# Patient Record
Sex: MALE | Race: OTHER | HISPANIC OR LATINO | URBAN - METROPOLITAN AREA
[De-identification: names, ages, dates, MRNs, and addresses within clinical notes are randomized per-mention and may not be internally consistent; named-entity substitution may affect disease eponyms.]

---

## 2023-05-28 ENCOUNTER — EMERGENCY (EMERGENCY)
Facility: HOSPITAL | Age: 43
LOS: 1 days | Discharge: HOME | End: 2023-05-28
Attending: EMERGENCY MEDICINE | Admitting: EMERGENCY MEDICINE
Payer: MEDICAID

## 2023-05-28 VITALS
TEMPERATURE: 98 F | SYSTOLIC BLOOD PRESSURE: 126 MMHG | HEART RATE: 79 BPM | DIASTOLIC BLOOD PRESSURE: 78 MMHG | RESPIRATION RATE: 18 BRPM | OXYGEN SATURATION: 97 %

## 2023-05-28 PROCEDURE — 82962 GLUCOSE BLOOD TEST: CPT

## 2023-05-28 PROCEDURE — 99283 EMERGENCY DEPT VISIT LOW MDM: CPT

## 2023-05-28 PROCEDURE — 99285 EMERGENCY DEPT VISIT HI MDM: CPT

## 2023-05-28 NOTE — ED PROVIDER NOTE - OBJECTIVE STATEMENT
43-year-old male denies past medical history brought in by ambulance after patient was attended to by [facility].  Patient reports drinking "too much alcohol".  EMS found patient awake and alert and ambulatory.  Patient denies any drug use, trauma, hallucinations. denies any past medical history or medication use.

## 2023-05-28 NOTE — ED PROVIDER NOTE - PATIENT PORTAL LINK FT
You can access the FollowMyHealth Patient Portal offered by [facility] by registering at the following website: [email or web address]. By joining [facility]’s FollowMyHealth portal, you will also be able to view your health information using other applications (apps) compatible with our system.

## 2023-05-28 NOTE — ED ADULT NURSE NOTE - NSFALLUNIVINTERV_ED_ALL_ED
Bed/Stretcher in lowest position, wheels locked, appropriate side rails in place/Call bell, personal items and telephone in reach/Instruct patient to call for assistance before getting out of bed/chair/stretcher/Non-slip footwear applied when patient is off stretcher/[place] to call system/Physically safe environment - no spills, clutter or unnecessary equipment/Purposeful proactive rounding/Room/bathroom lighting operational, light cord in reach

## 2023-05-28 NOTE — ED PROVIDER NOTE - CLINICAL SUMMARY MEDICAL DECISION MAKING FREE TEXT BOX
43-year-old male brought in by ambulance for suspected alcohol intoxication.  Patient is awake alert and ambulatory.  Admits to drinking alcohol.  Denies any SI, trauma or hallucinations.  Would like to go home via train back home where he lives in the [place].  Demonstrates full capacity to make medical decisions.

## 2023-05-28 NOTE — ED ADULT NURSE NOTE - NS ED NURSE LEVEL OF CONSCIOUSNESS AFFECT
Calm Per Nitida, ok for 10/3rd at 230pm for loop recorder implant.  Spoke with patient and confirmed date/time.  Reviewed pre procedural instructions, aware Covid testing per PAT.  Vaccinations are up to date, no Covid testing needed. Mailed ILR information to the patient, advised to review, call with any questions.    Prep for case initiated and sent to [facility] updated.    Please check for insurance authorization, thanks!!    LOOP RECORDER IMPLANT - CV [86149 (CPT®)]

## 2023-05-28 NOTE — ED PROVIDER NOTE - NSFOLLOWUPINSTRUCTIONS_ED_ALL_ED_FT
buso de alcohol    LO QUE NECESITA SABER:    El abuso de alcohol significa que usted phillip más de los límites diarios o semanales recomendados. Usted puede estar bebiendo alcohol regularmente o bebiendo grandes cantidades en un corto período (atracones de bebida). Usted sigue tomando, aun cuando esto le cause problemas legales, laborales o con jaime relaciones.    INSTRUCCIONES SOBRE EL AIME HOSPITALARIA:    Llame al número local de emergencias (911 en los [place]), o pídale a alguien que llame si:    Tiene dolor en el pecho o dificultad para respirar de forma repentina.    Usted quiere lastimarse o lastimar a otros.    Usted sufre tosha convulsión.  Busque atención médica de inmediato si:    Usted no puede dejar de vomitar o vomita jovi.    Llame a wiley médico si:    Usted tiene alucinaciones (ve o escucha cosas que no son reales).    Usted tiene preguntas o inquietudes acerca de wiley condición o cuidado.  Medicamentos:    Suplementos vitamínicospara tratar los niveles bajo de vitamina. El alcohol puede impedir la capacidad de wiley cuerpo para absorber suficiente vitaminas annie la vitamina B1. Los suplementos vitamínicos también pueden administrarse para prevenir los daños al cerebro relacionados con el consumo de alcohol.    Standard jaime medicamentos annie se le haya indicado.Consulte con wiley médico si usted kofi que wiley medicamento no le está ayudando o si presenta efectos secundarios. Infórmele al médico si usted es alérgico a algún medicamento. Mantenga tosha lista actualizada de los medicamentos, las vitaminas y los productos herbales que vic. Incluya los siguientes datos de los medicamentos: cantidad, frecuencia y motivo de administración. Traiga con usted la lista o los envases de las píldoras a jaime citas de seguimiento. Lleve la lista de los medicamentos con usted en sarah de tosha emergencia.  Límites recomendados de alcohol:Tosha bebida se define annie 12 onzas (oz) de cerveza, 5 onzas de vino o 1.5 onzas de licor, annie el whisky.    Los hombres de 21 a 64 añosdeberían limitar el consumo de alcohol a 2 tragos al día. No tome más de 4 bebidas por día o más de 14 en tosha semana.    Todos los hombres y las mujeres de 65 años o másdeberían limitar el consumo de alcohol a 1 trago por día. No tome más de 3 bebidas por día o más de 7 en tosha semana. No consuma bebidas alcohólicas si está embarazada.  Problemas de alexa que puede causar el abuso del alcohol:    Cáncer en hígado, páncreas, estómago, colon, riñón o mamas    Derrame cerebral o ataque cardíaco    Enfermedad hepática, renal o pulmonar    Desmayos, pérdida de memoria, daño cerebral o demencia    Diabetes, problemas del sistema inmunológico o deficiencia de tiamina (vitamina B1)    Problemas para usted y wiley bebé si phillip kartik el embarazo  Maneje el consumo de alcohol:    Trabaje con los médicos en los objetivos para beber menos.[place] puede ayudar a prevenir problemas de alexa. Por ejemplo, puede empezar por planificar wiley consumo semanal de alcohol. Será más fácil rodrigo menos bebidas si planifica con antelación.    Cuando randell alcohol, acompáñelo con comida.La comida evitará que el alcohol entre en wiley sistema demasiado rápido. Coma antes de rodrigo wiley primera bebida alcohólica.    Calcule con cuidado el tiempo de jaime bebidas.No tome más de tosha bebida por hora. Standard líquido, annie agua, café o un refresco, entre las bebidas alcohólicas.    No maneje si ha tomado alcohol.Planifique con antelación para tener un viaje seguro a casa. Asegúrese de que alguien que no haya estado bebiendo lo pueda llevar a casa. Planifique el uso de un taxi u otro servicio de transporte, si lo necesita.    No randell alcohol si está tomando shakila medicamento.El alcohol es peligroso cuando se combina con ciertos medicamentos, annie acetaminofeno o un medicamento para la presión arterial. Hable con wiley médico acerca de todos los medicamentos que está tomando.  Acuda a la consulta de control con wiley médico según las indicaciones:Anote jaime preguntas para que se acuerde de hacerlas kartik jaime visitas.    Para apoyo y más información:    Alcoholics Anonymous  Web Address: http://www.aa.org  Substance Abuse and Mental Health Services Administration  [address]  [place] [address]  Web Address: http://www.sama.gov

## 2024-01-06 NOTE — ED PROVIDER NOTE - CONDITION AT DISCHARGE:
- back pain radiating down posterior L leg  - given h/o spinal fusion, likely represents lumbar radiculopathy  - pain regimen as above  - PT eval - back pain radiating down posterior L leg  - given h/o spinal fusion, likely represents lumbar radiculopathy  - pain regimen as above  - PT eval Improved